# Patient Record
Sex: FEMALE | Race: WHITE | NOT HISPANIC OR LATINO | Employment: PART TIME | URBAN - METROPOLITAN AREA
[De-identification: names, ages, dates, MRNs, and addresses within clinical notes are randomized per-mention and may not be internally consistent; named-entity substitution may affect disease eponyms.]

---

## 2020-08-25 ENCOUNTER — TRANSFERRED RECORDS (OUTPATIENT)
Dept: PHYSICAL THERAPY | Facility: CLINIC | Age: 30
End: 2020-08-25

## 2020-09-11 ENCOUNTER — THERAPY VISIT (OUTPATIENT)
Dept: PHYSICAL THERAPY | Facility: CLINIC | Age: 30
End: 2020-09-11
Payer: COMMERCIAL

## 2020-09-11 DIAGNOSIS — M79.661 PAIN IN RIGHT SHIN: ICD-10-CM

## 2020-09-11 DIAGNOSIS — M79.662 PAIN IN LEFT SHIN: ICD-10-CM

## 2020-09-11 PROCEDURE — 97110 THERAPEUTIC EXERCISES: CPT | Mod: GP | Performed by: PHYSICAL THERAPIST

## 2020-09-11 PROCEDURE — 97161 PT EVAL LOW COMPLEX 20 MIN: CPT | Mod: GP | Performed by: PHYSICAL THERAPIST

## 2020-09-11 NOTE — LETTER
ALESHA NAVA [facility] [place]  [address] [address]  [address]  [place] [address]  [phone]    2020    Re: Radha Vogt   :   1990  MRN:  [id number]   REFERRING PHYSICIAN:   Roberta NAVA [facility] [place]    Date of Initial Evaluation: 20  Visits:  Rxs Used: 1  Reason for Referral:     Pain in left shin  Pain in right shin    EVALUATION SUMMARY    [facility] Initial Evaluation  Subjective:  The history is provided by the patient. No  was used.   Therapist Generated HPI Evaluation  Problem details: Patient reports onset of bilateral shin pain (L>R) that started in 2020 with no precipitating event at onset. She reports no change in activity level or new activities at that time. She reports that she did recently try and start working out at home and has difficulty with the jumping activities. .         Affected Side: bilateral shins.  This is a chronic condition.  Condition occurred with:  Insidious onset.  Where condition occurred: for unknown reasons.  Patient reports pain:  Anterior.  Pain is described as aching, sharp and stabbing and is constant.  Pain radiates to:  No radiation. Pain is worse in the P.M..  Since onset symptoms are unchanged.  Associated symptoms:  Loss of strength. Symptoms are exacerbated by weight bearing and walking  and relieved by rest.  Special tests included:  MRI (bilateral stress reaction).  Restrictions due to condition include:  Working in normal job without restrictions.  Barriers include:  None as reported by patient.    Patient Health History  Radha Vogt being seen for bilateral shin pain.     Date of Onset: 2020.   Problem occurred: unknown cause   Pain is reported as 5/10 (up to 7/10) on pain scale.  General health as reported by patient is fair.  Pertinent medical history includes: asthma, cancer, chemical dependency, depression, migraines/headaches, numbness/tingling, overweight and thyroid  problems.   Red flags:  Pain at rest/night.   Radha Vogt   :   1990        Surgeries include:  Cancer surgery, orthopedic surgery and other. Other surgery history details: melanoma 2005, thyroid 2018, osteochondroma 2004, gallbladder 2012.    Current medications:  Anti-depressants, thyroid medication and other. Other medications details: allergy.    Current occupation is Dietary Aide.   Primary job tasks include:  Lifting/carrying, prolonged standing and pushing/pulling. Objective:  Flexibility/Screens:   Lower Extremity:  Decreased left lower extremity flexibility:Gastroc and Soleus  Decreased right lower extremity flexibility:  Gastroc and Soleus    Ankle/Foot Evaluation  ROM:    AROM:    Dorsiflexion:  Left:   3  Right:   1  Plantarflexion:  Left:  60    Right:  58  Inversion:  Left:  35     Right:  45  Eversion:  18     Right:  15    Strength:    Dorsiflexion:  Left: 5-/5      Pain:-   Right: 5-/5    Pain:-  Plantarflexion: Left: 5/5    Pain:-/+   Right: 5/5   Pain:-/+  Inversion:Left: 5-/5   Pain:-     Right: 4+/5   Pain:-/+  Eversion:Left: 5-/5   Pain:-/+  Right: 5-/5   Pain:-  PALPATION: Palpation of ankle: Tenderness to bilateral anterior medial shin.  FUNCTIONAL TESTS:   Proprioception:  Stork Balance Test: Left: 30 sec  Right: 30 sec (increased ankle instability)          Hip Evaluation  Hip Strength:    Flexion:   Left: 5/5   -  Pain:  Right: 5/5   -  Pain:              Extension:  Left: 5-/5  -  Pain:Right: 5-/5    -  Pain:    Abduction:  Left: 5-/5    -   Pain:Right: 4-/5   -   Pain:    Assessment/Plan:    Patient is a 30 year old female with bilateral ankle complaints.    Patient has the following significant findings with corresponding treatment plan.                Diagnosis 1:  Bilateral shin pain  Pain -  self management, education, directional preference exercise and home program  Decreased ROM/flexibility - manual therapy, therapeutic exercise and home program  Decreased strength -  therapeutic exercise, therapeutic activities and home program  Impaired balance - neuro re-education, therapeutic activities and home program  Impaired muscle performance - neuro re-education and home program  Decreased function - therapeutic activities and home program        Radha Vogt   :   1990        Previous and current functional limitations:  (See Goal Flow Sheet for this information)    Short term and Long term goals: (See Goal Flow Sheet for this information)     Communication ability:  Patient appears to be able to clearly communicate and understand verbal and written communication and follow directions correctly.  Treatment Explanation - The following has been discussed with the patient:   RX ordered/plan of care  Anticipated outcomes  Possible risks and side effects  This patient would benefit from PT intervention to resume normal activities.   Rehab potential is good.    Frequency:  1 X week, once daily  Duration:  for 6 weeks  Discharge Plan:  Achieve all LTG.  Independent in home treatment program.  Reach maximal therapeutic benefit.          Thank you for your referral.    INQUIRIES  Therapist: Fito Escudero DPT, Cert. GERMÁN NAVA [facility] [place]  [address]  [place] [address]  Phone: [phone]  Fax: [phone]

## 2020-09-11 NOTE — PROGRESS NOTES
[facility] Initial Evaluation  Subjective:  The history is provided by the patient. No  was used.   Therapist Generated HPI Evaluation  Problem details: Patient reports onset of bilateral shin pain (L>R) that started in February 2020 with no precipitating event at onset. She reports no change in activity level or new activities at that time. She reports that she did recently try and start working out at home and has difficulty with the jumping activities. .         Affected Side: bilateral shins.    This is a chronic condition.  Condition occurred with:  Insidious onset.  Where condition occurred: for unknown reasons.  Patient reports pain:  Anterior.  Pain is described as aching, sharp and stabbing and is constant.  Pain radiates to:  No radiation. Pain is worse in the P.M..  Since onset symptoms are unchanged.  Associated symptoms:  Loss of strength. Symptoms are exacerbated by weight bearing and walking  and relieved by rest.  Special tests included:  MRI (bilateral stress reaction).    Restrictions due to condition include:  Working in normal job without restrictions.  Barriers include:  None as reported by patient.    Patient Health History  Radha Vogt being seen for bilateral shin pain.     Date of Onset: February 2020.   Problem occurred: unknown cause   Pain is reported as 5/10 (up to 7/10) on pain scale.  General health as reported by patient is fair.  Pertinent medical history includes: asthma, cancer, chemical dependency, depression, migraines/headaches, numbness/tingling, overweight and thyroid problems.   Red flags:  Pain at rest/night.     Surgeries include:  Cancer surgery, orthopedic surgery and other. Other surgery history details: melanoma 2005, thyroid 2018, osteochondroma 2004, gallbladder 2012.    Current medications:  Anti-depressants, thyroid medication and other. Other medications details: allergy.    Current occupation is Dietary Aide.   Primary job  tasks include:  Lifting/carrying, prolonged standing and pushing/pulling.                                    Objective:        Flexibility/Screens:       Lower Extremity:  Decreased left lower extremity flexibility:Gastroc and Soleus    Decreased right lower extremity flexibility:  Gastroc and Soleus          Ankle/Foot Evaluation  ROM:    AROM:    Dorsiflexion:  Left:   3  Right:   1  Plantarflexion:  Left:  60    Right:  58  Inversion:  Left:  35     Right:  45  Eversion:  18     Right:  15        Strength:    Dorsiflexion:  Left: 5-/5      Pain:-   Right: 5-/5    Pain:-  Plantarflexion: Left: 5/5    Pain:-/+   Right: 5/5   Pain:-/+  Inversion:Left: 5-/5   Pain:-     Right: 4+/5   Pain:-/+  Eversion:Left: 5-/5   Pain:-/+  Right: 5-/5   Pain:-                      PALPATION: Palpation of ankle: Tenderness to bilateral anterior medial shin.        FUNCTIONAL TESTS:           Proprioception:  Stork Balance Test: Left: 30 sec  Right: 30 sec (increased ankle instability)                                          Hip Evaluation    Hip Strength:    Flexion:   Left: 5/5   -  Pain:  Right: 5/5   -  Pain:                    Extension:  Left: 5-/5  -  Pain:Right: 5-/5    -  Pain:    Abduction:  Left: 5-/5    -   Pain:Right: 4-/5   -   Pain:                                 General     ROS    Assessment/Plan:    Patient is a 30 year old female with bilateral ankle complaints.    Patient has the following significant findings with corresponding treatment plan.                Diagnosis 1:  Bilateral shin pain  Pain -  self management, education, directional preference exercise and home program  Decreased ROM/flexibility - manual therapy, therapeutic exercise and home program  Decreased strength - therapeutic exercise, therapeutic activities and home program  Impaired balance - neuro re-education, therapeutic activities and home program  Impaired muscle performance - neuro re-education and home program  Decreased function -  therapeutic activities and home program    Therapy Evaluation Codes:     Cumulative Therapy Evaluation is: Low complexity.    Previous and current functional limitations:  (See Goal Flow Sheet for this information)    Short term and Long term goals: (See Goal Flow Sheet for this information)     Communication ability:  Patient appears to be able to clearly communicate and understand verbal and written communication and follow directions correctly.  Treatment Explanation - The following has been discussed with the patient:   RX ordered/plan of care  Anticipated outcomes  Possible risks and side effects  This patient would benefit from PT intervention to resume normal activities.   Rehab potential is good.    Frequency:  1 X week, once daily  Duration:  for 6 weeks  Discharge Plan:  Achieve all LTG.  Independent in home treatment program.  Reach maximal therapeutic benefit.    Please refer to the daily flowsheet for treatment today, total treatment time and time spent performing 1:1 timed codes.

## 2020-11-27 PROBLEM — M79.662 PAIN IN LEFT SHIN: Status: RESOLVED | Noted: 2020-09-11 | Resolved: 2020-11-27

## 2020-11-27 PROBLEM — M79.661 PAIN IN RIGHT SHIN: Status: RESOLVED | Noted: 2020-09-11 | Resolved: 2020-11-27

## 2020-11-27 NOTE — PROGRESS NOTES
Patient did not return to PT following initial evaluation on 9/11/20. Please let evaluation information serve as discharge information.